# Patient Record
(demographics unavailable — no encounter records)

---

## 2024-11-25 NOTE — DISCUSSION/SUMMARY
[de-identified] : The patient was advised of the diagnosis.  The natural history of the pathology was explained in full to the patient in layman's terms. All questions were answered.  The risks and benefits of surgical and non-surgical treatment alternatives were explained in full to the patient.  Entered by Gloria Lopes acting as a scribe.

## 2024-11-25 NOTE — IMAGING
[de-identified] : RIGHT HIP Incision is healed No pain with hip rotation Cane.  RIGHT KNEE Valgus no effusion  lateral tenderness.  LEFT FOOT/L-SPINE no EHL 0/5 no anterior tib 0/5 no loss of sensation good pulses nontender lumbar   Xray 3 views Right hip/pelvis - Implants good position and well fixed - no fracture or dislocation and Leg length wnl

## 2024-11-25 NOTE — HISTORY OF PRESENT ILLNESS
[de-identified] : 11/25/24: 1.5 years s/p R CORINNE. Here for annual checkup. Occ soreness when laying directly on her side but overall doing well and has good hip function. Feels she can do everything. Reports recent onset of drop foot on the left within the past week. Hx of drop foot and l-spine surgery about 30 years ago following a WC injury which did sig help her. Ambulates without assistance.   Previous doc:  Right hip pain x 2 years, no injury.  Worsening.  Pain with activity.  Uses cane due to pain.  Takes tylenol and advil daily.  H/O back surgery 1990s for left drop foot which helped and foot function returned. 6/6/23: 13 days s/p R CORINNE. She is doing well. Took the oxy sparingly.  7/10/23: 2 months postop - min hip pain.  Occasional lateral knee pain but not persistent. [FreeTextEntry5] : 1 year follow up. Having clicking sensation

## 2024-11-25 NOTE — ASSESSMENT
[FreeTextEntry1] : Previous doc: Severe OA right hip.  Discussed options - she has tried OTC meds without relief.  Will start PT and plan for right CORINNE.  No significant medical issues. 6/6/23: Nearly 2wks postop R CORINNE. She is doing well today and happy with progress. Begin outpatient PT. All questions and concerns were addressed. Follow up in 6 weeks and repeat XR. 7/10/23: 2 months s/p right CORINNE doing very well.  Occasional right knee pain likely related to valgus knee and increasing activity.  Seems more IT bad pain radiating to the knee Cont PT/HEP, will return at 1 year postop or sooner if she feels knee pain worsens.  11/25/24: 1.5 years s/p R CORINNE. XR look good, CORINNE in good position. Doing very well from a hip perspective, has good motion and function but acute onset of drop foot on the left. Has hx of this in the past and previous spine surgery. Will obtain STAT EMG and lumbar STAT MRI to eval HNP/drop foot. Given AFO brace Rx. F/up with Dr. King to review MRI

## 2024-12-03 NOTE — IMAGING
[de-identified] : no lumbar paraspinal muscle tenderness no lumbar paraspinal muscle spasm   no pain with flexion no pain with extension full ROM with mild stiffness   L DF 1/5, 5/5 remainder sensory intact -SLR no dermatome of pain   ambulates without assistive device non antalgic gait unable to heel walk on the L

## 2024-12-03 NOTE — ASSESSMENT
[FreeTextEntry1] : tough decision is needed;  there is left (symptomatic side) lateral recess stenosis, moderate;  that would adversely affect the traversing L4 root that could be a contributing but probably not the sole cause for her foot drop;  the EMG SUGGESTS peroneal neuropathy but it was done within 2 weeks of onset so there is question about reliability of that study;  Anni, and I will make a call in the next day or so;  she and I are leaning toward a left L34 laminoforaminotomy;  73 yo F with acute foot drop for about 10 days without injury. Hx of L foot drop 27 years ago treated with lumbar decompression where strength was restored. EMG with acute peroneal neuropathy. MRI LS without large HNP/significant neural compression. Recommend AFO and PT.

## 2024-12-03 NOTE — HISTORY OF PRESENT ILLNESS
[Lower back] : lower back [de-identified] : 71 yo F presenting with L foot drop x 10 days, started when she got out of bed, no specific injury. States she had spine surgery 27 years ago for foot drop that resolved - s/p L lumbar decompression L4-5. Minimal pain, gait is off and the L knee is started to bother him.   EMG 11/2024: acute L sided common peroneal neuropathy.  s/p R CORINNE about 1.5 years ago with Dr Babb hx of uterine ca in 2007 s/p hysterectomy and chemo

## 2024-12-03 NOTE — REASON FOR VISIT
Anesthesia Evaluation     Patient summary reviewed and Nursing notes reviewed                Airway   Mallampati: I  TM distance: >3 FB  Neck ROM: full  No difficulty expected  Dental    (+) lower dentures and upper dentures    Pulmonary - negative pulmonary ROS and normal exam    breath sounds clear to auscultation  Cardiovascular - normal exam    Rhythm: regular    (+) hypertension, past MI , dysrhythmias, CHF ,       Neuro/Psych- negative ROS  GI/Hepatic/Renal/Endo - negative ROS     Musculoskeletal     Abdominal    Substance History - negative use     OB/GYN negative ob/gyn ROS         Other   arthritis,                      Anesthesia Plan    ASA 4     general with block     intravenous induction     Anesthetic plan, all risks, benefits, and alternatives have been provided, discussed and informed consent has been obtained with: patient.       [FreeTextEntry2] : New consult lower back pain as per Dr Babb MRI results MRI done at [facility]

## 2024-12-03 NOTE — IMAGING
[de-identified] : no lumbar paraspinal muscle tenderness no lumbar paraspinal muscle spasm   no pain with flexion no pain with extension full ROM with mild stiffness   L DF 1/5, 5/5 remainder sensory intact -SLR no dermatome of pain   ambulates without assistive device non antalgic gait unable to heel walk on the L

## 2024-12-03 NOTE — HISTORY OF PRESENT ILLNESS
[Lower back] : lower back [de-identified] : 71 yo F presenting with L foot drop x 10 days, started when she got out of bed, no specific injury. States she had spine surgery 27 years ago for foot drop that resolved - s/p L lumbar decompression L4-5. Minimal pain, gait is off and the L knee is started to bother him.   EMG 11/2024: acute L sided common peroneal neuropathy.  s/p R CORINNE about 1.5 years ago with Dr Babb hx of uterine ca in 2007 s/p hysterectomy and chemo

## 2024-12-03 NOTE — ASSESSMENT
[FreeTextEntry1] : tough decision is needed;  there is left (symptomatic side) lateral recess stenosis, moderate;  that would adversely affect the traversing L4 root that could be a contributing but probably not the sole cause for her foot drop;  the EMG SUGGESTS peroneal neuropathy but it was done within 2 weeks of onset so there is question about reliability of that study;  Anni, and I will make a call in the next day or so;  she and I are leaning toward a left L34 laminoforaminotomy;  71 yo F with acute foot drop for about 10 days without injury. Hx of L foot drop 27 years ago treated with lumbar decompression where strength was restored. EMG with acute peroneal neuropathy. MRI LS without large HNP/significant neural compression. Recommend AFO and PT.

## 2025-05-20 NOTE — IMAGING
[Left] : left hip with pelvis [Severe arthritis (Tonnis Grade 3)] : Severe arthritis (Tonnis Grade 3) [de-identified] : RIGHT HIP Incision is healed No pain with hip rotation Cane.  RIGHT KNEE Valgus no effusion  lateral tenderness.  LEFT FOOT/L-SPINE EHL 5/5 anterior tib 5/5 no loss of sensation good pulses nontender lumbar 4/5 hip flexion  LEFT HIP + Groin tenderness (-) Greater troch bursa tenderness + Buttock tenderness + Impingement Limited flexion, internal rotation, external rotation

## 2025-05-20 NOTE — ASSESSMENT
[FreeTextEntry1] : Previous doc: Severe OA right hip.  Discussed options - she has tried OTC meds without relief.  Will start PT and plan for right CORINNE.  No significant medical issues. 6/6/23: Nearly 2wks postop R CORINNE. She is doing well today and happy with progress. Begin outpatient PT. All questions and concerns were addressed. Follow up in 6 weeks and repeat XR. 7/10/23: 2 months s/p right CORINNE doing very well.  Occasional right knee pain likely related to valgus knee and increasing activity.  Seems more IT bad pain radiating to the knee Cont PT/HEP, will return at 1 year postop or sooner if she feels knee pain worsens. 11/25/24: 1.5 years s/p R CORINNE. XR look good, CORINNE in good position. Doing very well from a hip perspective, has good motion and function but acute onset of drop foot on the left. Has hx of this in the past and previous spine surgery. Will obtain STAT EMG and lumbar STAT MRI to eval HNP/drop foot. Given AFO brace Rx. F/up with Dr. King to review MRI  5/20/25: 2 years s/p R CORINNE. RT hip doing well. XRs show severe OA left hip. Discussed conservative vs operative treatment options. Would like to proceed with LT CORINNE w [facility] [employer] same day. Discussed risks and benefits; preop and postop periods in detail. All questions answered. Surgical davila will call to schedule procedure.

## 2025-05-20 NOTE — DISCUSSION/SUMMARY
[de-identified] : The natural progression of Osteoarthritis was explained to the patient.  We discussed the possible treatment options from conservative to operative.  These included NSAIDS, Glucosamine and Chondroitin sulfate, and Physical Therapy as well different types of injections.  We also discussed that at some point they may progress to needing a CORINNE.  Information and pamphlets were given when appropriate.   Patient Complains of pain in Hip with a level that often reaches greater than a 8/10. The Pain has been progressively worsening of his/her treatment course. The pain has interfered with their ADLs and worsens with weight bearing. On exam pain worsens with ROM passive and active and I measured a limited ROM.   X-rays were reviewed with the patient, and they show joint space narrowing, subchondral sclerosis, osteophyte formation, and subchondral cysts.   After a period of more than 12 weeks physical therapy or exercise program done with me or another treating physician, they have continued pain. The patient has failed a trial of NSAID medication or pain relievers if they were unable to tolerate NSAID medications. After a long discussion with the patient both the patient and I have decided we have exhausted all forms of less radical treatments, and they would like to proceed with Total Hip Replacement.   We discussed my findings and the natural history of their condition.  We talked about the details of the proposed surgery and the recovery.  We discussed the material risks, possible benefits and alternatives to surgery.  The risks include but are not limited to infection, bleeding and possible need for blood transfusion, fracture, bowel blockage, bladder retention or infection, need for reoperation, stiffness and/or limited range of motion, possible damage to nerves and blood vessels, failure of fixation of components, risk of deep vein thromboses and pulmonary embolism, wound healing problems, dislocation, and possible leg length discrepancy.  Although incredibly rare, we also discussed the risks of a cardiac event, stroke and even death during, or following, the surgery.  We discussed the type of implants the patient will be receiving and the type of fixation that will be used, as well as whether a robot or computer navigation aide will be used.  The patient understands they will need medical clearance and will attend a preoperative joint education class.  We also discussed the type of anesthesia they will receive, and the risks associated with hospital or rehab length of stay, obesity, diabetes and smoking.

## 2025-05-20 NOTE — HISTORY OF PRESENT ILLNESS
[Dull/Aching] : dull/aching [Radiating] : radiating [Throbbing] : throbbing [] : yes [de-identified] : 5/20/25: Patient here 2 years s/p RT CORINNE. Doing very well and is happy with RT hip. LT hip pain began about a few weeks ago. Has been doing home exercises and stretching without relief. Lt foot drop came back from herniated disc in spine. Has been seeing Dr. King. Patient is ambulating with a cane.  Previous doc:  Right hip pain x 2 years, no injury.  Worsening.  Pain with activity.  Uses cane due to pain.  Takes tylenol and advil daily.  H/O back surgery 1990s for left drop foot which helped and foot function returned. 6/6/23: 13 days s/p R CORINNE. She is doing well. Took the oxy sparingly.  7/10/23: 2 months postop - min hip pain.  Occasional lateral knee pain but not persistent. 11/25/24: 1.5 years s/p R CORINNE. Here for annual checkup. Occ soreness when laying directly on her side but overall doing well and has good hip function. Feels she can do everything. Reports recent onset of drop foot on the left within the past week. Hx of drop foot and l-spine surgery about 30 years ago following a WC injury which did sig help her. Ambulates without assistance. ick recover. using a cane. trying hep with no relief.  [FreeTextEntry5] : 1 year follow up. Having clicking sensation

## 2025-07-01 NOTE — PHYSICAL EXAM
[Left] : left hip with pelvis [AP] : anteroposterior [Lateral] : lateral [de-identified] : Left hip: Inc c/d/i.  Mod swelling.  NVI.  Walker. [FreeTextEntry9] : L CORINNE with cup displacement and medial wall fx.

## 2025-07-01 NOTE — HISTORY OF PRESENT ILLNESS
[de-identified] : 7/1/25: 2 weeks s/p left CORINNE with abductor tendon repair.  Having a lot of pain but only taking tylenol.  No fevers/chills.  Using brace and walker.  Home PT.  Previous doc:  Right hip pain x 2 years, no injury.  Worsening.  Pain with activity.  Uses cane due to pain.  Takes tylenol and advil daily.  H/O back surgery 1990s for left drop foot which helped and foot function returned. 6/6/23: 13 days s/p R CORINNE. She is doing well. Took the oxy sparingly.  7/10/23: 2 months postop - min hip pain.  Occasional lateral knee pain but not persistent. 11/25/24: 1.5 years s/p R CORINNE. Here for annual checkup. Occ soreness when laying directly on her side but overall doing well and has good hip function. Feels she can do everything. Reports recent onset of drop foot on the left within the past week. Hx of drop foot and l-spine surgery about 30 years ago following a WC injury which did sig help her. Ambulates without assistance. ick recover. using a cane. trying hep with no relief.  5/20/25: Patient here 2 years s/p RT CORINNE. Doing very well and is happy with RT hip. LT hip pain began about a few weeks ago. Has been doing home exercises and stretching without relief. Lt foot drop came back from herniated disc in spine. Has been seeing Dr. King. Patient is ambulating with a cane. [de-identified] : home therapy

## 2025-07-01 NOTE — DISCUSSION/SUMMARY
[de-identified] : The patient was advised of the diagnosis.  The natural history of the pathology was explained in full to the patient in layman's terms. All questions were answered.  The risks and benefits of surgical and non-surgical treatment alternatives were explained in full to the patient.  I saw the patient under the supervision of Dr. Babb and followed his plan of care.

## 2025-07-01 NOTE — ASSESSMENT
[FreeTextEntry1] : Previous doc: Severe OA right hip.  Discussed options - she has tried OTC meds without relief.  Will start PT and plan for right CORINNE.  No significant medical issues. 6/6/23: Nearly 2wks postop R CORINNE. She is doing well today and happy with progress. Begin outpatient PT. All questions and concerns were addressed. Follow up in 6 weeks and repeat XR. 7/10/23: 2 months s/p right CORINNE doing very well.  Occasional right knee pain likely related to valgus knee and increasing activity.  Seems more IT bad pain radiating to the knee Cont PT/HEP, will return at 1 year postop or sooner if she feels knee pain worsens. 11/25/24: 1.5 years s/p R OCRINNE. XR look good, CORINNE in good position. Doing very well from a hip perspective, has good motion and function but acute onset of drop foot on the left. Has hx of this in the past and previous spine surgery. Will obtain STAT EMG and lumbar STAT MRI to eval HNP/drop foot. Given AFO brace Rx. F/up with Dr. King to review MRI 5/20/25: 2 years s/p R CORINNE. RT hip doing well. XRs show severe OA left hip. Discussed conservative vs operative treatment options. Would like to proceed with LT CORINNE w [employer] same day. Discussed risks and benefits; preop and postop periods in detail. All questions answered. Surgical davila will call to schedule procedure.  7/1/25: 2 weeks s/p left CORINNE with abductor tendon repair - XRs today showing medial wall fx with displacement of the cup - need to fix this ASAP and will plan for return to OR this week.  Cont with walker and brace.

## 2025-07-22 NOTE — PHYSICAL EXAM
[Left] : left hip with pelvis [AP] : anteroposterior [Lateral] : lateral [de-identified] : Left hip: Inc c/d/i. NVI.  Walker. [Components well fixed, in good position] : Components well fixed, in good position

## 2025-07-22 NOTE — HISTORY OF PRESENT ILLNESS
[] : Post Surgical Visit: yes [de-identified] : 7/22/25: 2 weeks s/p cup revision.  Pain tolerable, no fevers/chills.  Previous doc:  Right hip pain x 2 years, no injury.  Worsening.  Pain with activity.  Uses cane due to pain.  Takes tylenol and advil daily.  H/O back surgery 1990s for left drop foot which helped and foot function returned. 6/6/23: 13 days s/p R CORINNE. She is doing well. Took the oxy sparingly.  7/10/23: 2 months postop - min hip pain.  Occasional lateral knee pain but not persistent. 11/25/24: 1.5 years s/p R CORINNE. Here for annual checkup. Occ soreness when laying directly on her side but overall doing well and has good hip function. Feels she can do everything. Reports recent onset of drop foot on the left within the past week. Hx of drop foot and l-spine surgery about 30 years ago following a WC injury which did sig help her. Ambulates without assistance. ick recover. using a cane. trying hep with no relief.  5/20/25: Patient here 2 years s/p RT CORINNE. Doing very well and is happy with RT hip. LT hip pain began about a few weeks ago. Has been doing home exercises and stretching without relief. Lt foot drop came back from herniated disc in spine. Has been seeing Dr. King. Patient is ambulating with a cane. 7/1/25: 2 weeks s/p left CORINNE with abductor tendon repair.  Having a lot of pain but only taking tylenol.  No fevers/chills.  Using brace and walker.  Home PT. [de-identified] : home therapy  [de-identified] : VALE SUN

## 2025-07-22 NOTE — ASSESSMENT
[FreeTextEntry1] : Previous doc: Severe OA right hip.  Discussed options - she has tried OTC meds without relief.  Will start PT and plan for right CORINNE.  No significant medical issues. 6/6/23: Nearly 2wks postop R CORINNE. She is doing well today and happy with progress. Begin outpatient PT. All questions and concerns were addressed. Follow up in 6 weeks and repeat XR. 7/10/23: 2 months s/p right CORINNE doing very well.  Occasional right knee pain likely related to valgus knee and increasing activity.  Seems more IT bad pain radiating to the knee Cont PT/HEP, will return at 1 year postop or sooner if she feels knee pain worsens. 11/25/24: 1.5 years s/p R CORINNE. XR look good, CORINNE in good position. Doing very well from a hip perspective, has good motion and function but acute onset of drop foot on the left. Has hx of this in the past and previous spine surgery. Will obtain STAT EMG and lumbar STAT MRI to eval HNP/drop foot. Given AFO brace Rx. F/up with Dr. King to review MRI 5/20/25: 2 years s/p R CORINNE. RT hip doing well. XRs show severe OA left hip. Discussed conservative vs operative treatment options. Would like to proceed with LT CORINNE w [employer] same day. Discussed risks and benefits; preop and postop periods in detail. All questions answered. Surgical davila will call to schedule procedure. 7/1/25: 2 weeks s/p left CORINNE with abductor tendon repair - XRs today showing medial wall fx with displacement of the cup - need to fix this ASAP and will plan for return to OR this week.  Cont with walker and brace.  7/22/25: 2 weeks s/p revision, doing well.  No signs of infection.  Cont flat foot weight beraing with walker, d/c brace, return in 4 weeks.  Will likely begin PT at that time.

## 2025-07-22 NOTE — DISCUSSION/SUMMARY
[de-identified] : The patient was advised of the diagnosis.  The natural history of the pathology was explained in full to the patient in layman's terms. All questions were answered.  The risks and benefits of surgical and non-surgical treatment alternatives were explained in full to the patient.  I saw the patient under the supervision of Dr. Babb and followed his plan of care.